# Patient Record
Sex: FEMALE | Race: WHITE | NOT HISPANIC OR LATINO | URBAN - METROPOLITAN AREA
[De-identification: names, ages, dates, MRNs, and addresses within clinical notes are randomized per-mention and may not be internally consistent; named-entity substitution may affect disease eponyms.]

---

## 2018-05-31 ENCOUNTER — CONVERSION ENCOUNTER (OUTPATIENT)
Dept: FAMILY MEDICINE CLINIC | Facility: CLINIC | Age: 48
End: 2018-05-31

## 2018-05-31 ENCOUNTER — OFFICE VISIT CONVERTED (OUTPATIENT)
Dept: FAMILY MEDICINE CLINIC | Facility: CLINIC | Age: 48
End: 2018-05-31
Attending: NURSE PRACTITIONER

## 2018-07-25 ENCOUNTER — CONVERSION ENCOUNTER (OUTPATIENT)
Dept: MAMMOGRAPHY | Facility: HOSPITAL | Age: 48
End: 2018-07-25

## 2019-02-15 ENCOUNTER — HOSPITAL ENCOUNTER (OUTPATIENT)
Dept: OTHER | Facility: HOSPITAL | Age: 49
Discharge: HOME | End: 2019-02-15

## 2019-02-15 LAB
ALBUMIN SERPL-MCNC: 4.3 G/DL (ref 3.5–5)
ALBUMIN/GLOB SERPL: 1.8 {RATIO} (ref 1.4–2.6)
ALP SERPL-CCNC: 73 U/L (ref 42–98)
ALT SERPL-CCNC: 16 U/L (ref 10–40)
ANION GAP SERPL CALC-SCNC: 15 MMOL/L (ref 8–19)
AST SERPL-CCNC: 16 U/L (ref 15–50)
BASOPHILS # BLD AUTO: 0.06 10*3/UL (ref 0–0.2)
BASOPHILS NFR BLD AUTO: 0.7 % (ref 0–3)
BILIRUB SERPL-MCNC: 0.39 MG/DL (ref 0.2–1.3)
BUN SERPL-MCNC: 14 MG/DL (ref 5–25)
BUN/CREAT SERPL: 19 {RATIO} (ref 6–20)
CALCIUM SERPL-MCNC: 9.7 MG/DL (ref 8.7–10.4)
CHLORIDE SERPL-SCNC: 109 MMOL/L (ref 99–111)
CHOLEST SERPL-MCNC: 167 MG/DL (ref 107–200)
CHOLEST/HDLC SERPL: 3 {RATIO} (ref 3–6)
CONV ABS IMM GRAN: 0.02 10*3/UL (ref 0–0.2)
CONV CO2: 23 MMOL/L (ref 22–32)
CONV IMMATURE GRAN: 0.2 % (ref 0–1.8)
CONV TOTAL PROTEIN: 6.7 G/DL (ref 6.3–8.2)
CREAT UR-MCNC: 0.73 MG/DL (ref 0.5–0.9)
DEPRECATED RDW RBC AUTO: 41 FL (ref 36.4–46.3)
EOSINOPHIL # BLD AUTO: 0.41 10*3/UL (ref 0–0.7)
EOSINOPHIL # BLD AUTO: 4.8 % (ref 0–7)
ERYTHROCYTE [DISTWIDTH] IN BLOOD BY AUTOMATED COUNT: 12.7 % (ref 11.7–14.4)
GFR SERPLBLD BASED ON 1.73 SQ M-ARVRAT: >60 ML/MIN/{1.73_M2}
GLOBULIN UR ELPH-MCNC: 2.4 G/DL (ref 2–3.5)
GLUCOSE SERPL-MCNC: 81 MG/DL (ref 65–99)
HBA1C MFR BLD: 14.9 G/DL (ref 12–16)
HCT VFR BLD AUTO: 44.4 % (ref 37–47)
HDLC SERPL-MCNC: 56 MG/DL (ref 40–60)
LDLC SERPL CALC-MCNC: 91 MG/DL (ref 70–100)
LYMPHOCYTES # BLD AUTO: 2.45 10*3/UL (ref 1–5)
MCH RBC QN AUTO: 29.5 PG (ref 27–31)
MCHC RBC AUTO-ENTMCNC: 33.6 G/DL (ref 33–37)
MCV RBC AUTO: 87.9 FL (ref 81–99)
MONOCYTES # BLD AUTO: 0.41 10*3/UL (ref 0.2–1.2)
MONOCYTES NFR BLD AUTO: 4.8 % (ref 3–10)
NEUTROPHILS # BLD AUTO: 5.11 10*3/UL (ref 2–8)
NEUTROPHILS NFR BLD AUTO: 60.5 % (ref 30–85)
NRBC CBCN: 0 % (ref 0–0.7)
OSMOLALITY SERPL CALC.SUM OF ELEC: 296 MOSM/KG (ref 273–304)
PLATELET # BLD AUTO: 356 10*3/UL (ref 130–400)
PMV BLD AUTO: 9.1 FL (ref 9.4–12.3)
POTASSIUM SERPL-SCNC: 4.2 MMOL/L (ref 3.5–5.3)
RBC # BLD AUTO: 5.05 10*6/UL (ref 4.2–5.4)
SODIUM SERPL-SCNC: 143 MMOL/L (ref 135–147)
TRIGL SERPL-MCNC: 100 MG/DL (ref 40–150)
TSH SERPL-ACNC: 0.01 M[IU]/L (ref 0.27–4.2)
VARIANT LYMPHS NFR BLD MANUAL: 29 % (ref 20–45)
VLDLC SERPL-MCNC: 20 MG/DL (ref 5–37)
WBC # BLD AUTO: 8.46 10*3/UL (ref 4.8–10.8)

## 2019-07-08 ENCOUNTER — HOSPITAL ENCOUNTER (OUTPATIENT)
Dept: FAMILY MEDICINE CLINIC | Facility: CLINIC | Age: 49
Discharge: HOME | End: 2019-07-08
Attending: NURSE PRACTITIONER

## 2019-07-08 ENCOUNTER — OFFICE VISIT CONVERTED (OUTPATIENT)
Dept: FAMILY MEDICINE CLINIC | Facility: CLINIC | Age: 49
End: 2019-07-08
Attending: NURSE PRACTITIONER

## 2019-07-12 LAB
CONV LAST MENSTURAL PERIOD: NORMAL
SPECIMEN SOURCE: NORMAL
SPECIMEN SOURCE: NORMAL
THIN PREP CVX: NORMAL

## 2019-08-05 ENCOUNTER — HOSPITAL ENCOUNTER (OUTPATIENT)
Dept: OTHER | Facility: HOSPITAL | Age: 49
Discharge: HOME | End: 2019-08-05
Attending: NURSE PRACTITIONER

## 2019-08-05 LAB — T4 FREE SERPL-MCNC: 1.2 NG/DL (ref 0.9–1.8)

## 2019-08-30 ENCOUNTER — OFFICE VISIT CONVERTED (OUTPATIENT)
Dept: FAMILY MEDICINE CLINIC | Facility: CLINIC | Age: 49
End: 2019-08-30
Attending: NURSE PRACTITIONER

## 2019-09-04 ENCOUNTER — HOSPITAL ENCOUNTER (OUTPATIENT)
Dept: MAMMOGRAPHY | Facility: HOSPITAL | Age: 49
Discharge: HOME | End: 2019-09-04
Attending: NURSE PRACTITIONER

## 2019-09-04 LAB
ALBUMIN SERPL-MCNC: 4.5 G/DL (ref 3.5–5)
ALBUMIN/GLOB SERPL: 1.7 {RATIO} (ref 1.4–2.6)
ALP SERPL-CCNC: 80 U/L (ref 42–98)
ALT SERPL-CCNC: 18 U/L (ref 10–40)
ANION GAP SERPL CALC-SCNC: 17 MMOL/L (ref 8–19)
AST SERPL-CCNC: 18 U/L (ref 15–50)
BASOPHILS # BLD AUTO: 0.06 10*3/UL (ref 0–0.2)
BASOPHILS NFR BLD AUTO: 0.6 % (ref 0–3)
BILIRUB SERPL-MCNC: 0.21 MG/DL (ref 0.2–1.3)
BUN SERPL-MCNC: 16 MG/DL (ref 5–25)
BUN/CREAT SERPL: 18 {RATIO} (ref 6–20)
CALCIUM SERPL-MCNC: 9.2 MG/DL (ref 8.7–10.4)
CHLORIDE SERPL-SCNC: 108 MMOL/L (ref 99–111)
CONV ABS IMM GRAN: 0.02 10*3/UL (ref 0–0.2)
CONV CO2: 20 MMOL/L (ref 22–32)
CONV IMMATURE GRAN: 0.2 % (ref 0–1.8)
CONV TOTAL PROTEIN: 7.2 G/DL (ref 6.3–8.2)
CREAT UR-MCNC: 0.9 MG/DL (ref 0.5–0.9)
DEPRECATED RDW RBC AUTO: 38.7 FL (ref 36.4–46.3)
EOSINOPHIL # BLD AUTO: 0.31 10*3/UL (ref 0–0.7)
EOSINOPHIL # BLD AUTO: 3.2 % (ref 0–7)
ERYTHROCYTE [DISTWIDTH] IN BLOOD BY AUTOMATED COUNT: 12 % (ref 11.7–14.4)
GFR SERPLBLD BASED ON 1.73 SQ M-ARVRAT: >60 ML/MIN/{1.73_M2}
GLOBULIN UR ELPH-MCNC: 2.7 G/DL (ref 2–3.5)
GLUCOSE SERPL-MCNC: 90 MG/DL (ref 65–99)
HCT VFR BLD AUTO: 44 % (ref 37–47)
HGB BLD-MCNC: 15.2 G/DL (ref 12–16)
LYMPHOCYTES # BLD AUTO: 3.19 10*3/UL (ref 1–5)
LYMPHOCYTES NFR BLD AUTO: 33.4 % (ref 20–45)
MCH RBC QN AUTO: 30.5 PG (ref 27–31)
MCHC RBC AUTO-ENTMCNC: 34.5 G/DL (ref 33–37)
MCV RBC AUTO: 88.4 FL (ref 81–99)
MONOCYTES # BLD AUTO: 0.58 10*3/UL (ref 0.2–1.2)
MONOCYTES NFR BLD AUTO: 6.1 % (ref 3–10)
NEUTROPHILS # BLD AUTO: 5.39 10*3/UL (ref 2–8)
NEUTROPHILS NFR BLD AUTO: 56.5 % (ref 30–85)
NRBC CBCN: 0 % (ref 0–0.7)
OSMOLALITY SERPL CALC.SUM OF ELEC: 293 MOSM/KG (ref 273–304)
PLATELET # BLD AUTO: 326 10*3/UL (ref 130–400)
PMV BLD AUTO: 9 FL (ref 9.4–12.3)
POTASSIUM SERPL-SCNC: 3.9 MMOL/L (ref 3.5–5.3)
RBC # BLD AUTO: 4.98 10*6/UL (ref 4.2–5.4)
SODIUM SERPL-SCNC: 141 MMOL/L (ref 135–147)
WBC # BLD AUTO: 9.55 10*3/UL (ref 4.8–10.8)

## 2019-09-06 ENCOUNTER — HOSPITAL ENCOUNTER (OUTPATIENT)
Dept: GENERAL RADIOLOGY | Facility: HOSPITAL | Age: 49
Discharge: HOME | End: 2019-09-06
Attending: NURSE PRACTITIONER

## 2020-02-25 ENCOUNTER — HOSPITAL ENCOUNTER (OUTPATIENT)
Dept: OTHER | Facility: HOSPITAL | Age: 50
Discharge: HOME | End: 2020-02-25

## 2020-02-25 LAB
ALBUMIN SERPL-MCNC: 4.2 G/DL (ref 3.5–5)
ALBUMIN/GLOB SERPL: 1.6 {RATIO} (ref 1.4–2.6)
ALP SERPL-CCNC: 71 U/L (ref 42–98)
ALT SERPL-CCNC: 24 U/L (ref 10–40)
ANION GAP SERPL CALC-SCNC: 17 MMOL/L (ref 8–19)
AST SERPL-CCNC: 18 U/L (ref 15–50)
BASOPHILS # BLD AUTO: 0.03 10*3/UL (ref 0–0.2)
BASOPHILS NFR BLD AUTO: 0.4 % (ref 0–3)
BILIRUB SERPL-MCNC: 0.19 MG/DL (ref 0.2–1.3)
BUN SERPL-MCNC: 10 MG/DL (ref 5–25)
BUN/CREAT SERPL: 11 {RATIO} (ref 6–20)
CALCIUM SERPL-MCNC: 9.4 MG/DL (ref 8.7–10.4)
CHLORIDE SERPL-SCNC: 108 MMOL/L (ref 99–111)
CHOLEST SERPL-MCNC: 151 MG/DL (ref 107–200)
CHOLEST/HDLC SERPL: 2.9 {RATIO} (ref 3–6)
CONV ABS IMM GRAN: 0.01 10*3/UL (ref 0–0.2)
CONV CO2: 20 MMOL/L (ref 22–32)
CONV IMMATURE GRAN: 0.1 % (ref 0–1.8)
CONV TOTAL PROTEIN: 6.8 G/DL (ref 6.3–8.2)
CREAT UR-MCNC: 0.89 MG/DL (ref 0.5–0.9)
DEPRECATED RDW RBC AUTO: 37.9 FL (ref 36.4–46.3)
EOSINOPHIL # BLD AUTO: 0.34 10*3/UL (ref 0–0.7)
EOSINOPHIL # BLD AUTO: 5 % (ref 0–7)
ERYTHROCYTE [DISTWIDTH] IN BLOOD BY AUTOMATED COUNT: 11.9 % (ref 11.7–14.4)
GFR SERPLBLD BASED ON 1.73 SQ M-ARVRAT: >60 ML/MIN/{1.73_M2}
GLOBULIN UR ELPH-MCNC: 2.6 G/DL (ref 2–3.5)
GLUCOSE SERPL-MCNC: 96 MG/DL (ref 65–99)
HCT VFR BLD AUTO: 41.2 % (ref 37–47)
HDLC SERPL-MCNC: 52 MG/DL (ref 40–60)
HGB BLD-MCNC: 14.3 G/DL (ref 12–16)
LDLC SERPL CALC-MCNC: 82 MG/DL (ref 70–100)
LYMPHOCYTES # BLD AUTO: 1.85 10*3/UL (ref 1–5)
LYMPHOCYTES NFR BLD AUTO: 27.1 % (ref 20–45)
MCH RBC QN AUTO: 30.3 PG (ref 27–31)
MCHC RBC AUTO-ENTMCNC: 34.7 G/DL (ref 33–37)
MCV RBC AUTO: 87.3 FL (ref 81–99)
MONOCYTES # BLD AUTO: 0.4 10*3/UL (ref 0.2–1.2)
MONOCYTES NFR BLD AUTO: 5.9 % (ref 3–10)
NEUTROPHILS # BLD AUTO: 4.19 10*3/UL (ref 2–8)
NEUTROPHILS NFR BLD AUTO: 61.5 % (ref 30–85)
NRBC CBCN: 0 % (ref 0–0.7)
OSMOLALITY SERPL CALC.SUM OF ELEC: 291 MOSM/KG (ref 273–304)
PLATELET # BLD AUTO: 274 10*3/UL (ref 130–400)
PMV BLD AUTO: 8.8 FL (ref 9.4–12.3)
POTASSIUM SERPL-SCNC: 4.2 MMOL/L (ref 3.5–5.3)
RBC # BLD AUTO: 4.72 10*6/UL (ref 4.2–5.4)
SODIUM SERPL-SCNC: 141 MMOL/L (ref 135–147)
TRIGL SERPL-MCNC: 83 MG/DL (ref 40–150)
TSH SERPL-ACNC: 0.01 M[IU]/L (ref 0.27–4.2)
VLDLC SERPL-MCNC: 17 MG/DL (ref 5–37)
WBC # BLD AUTO: 6.82 10*3/UL (ref 4.8–10.8)

## 2020-04-16 ENCOUNTER — OFFICE VISIT CONVERTED (OUTPATIENT)
Dept: FAMILY MEDICINE CLINIC | Facility: CLINIC | Age: 50
End: 2020-04-16
Attending: NURSE PRACTITIONER

## 2020-04-16 ENCOUNTER — HOSPITAL ENCOUNTER (OUTPATIENT)
Dept: FAMILY MEDICINE CLINIC | Facility: CLINIC | Age: 50
Discharge: HOME | End: 2020-04-16
Attending: NURSE PRACTITIONER

## 2020-04-18 LAB — BACTERIA UR CULT: NORMAL

## 2020-04-20 ENCOUNTER — HOSPITAL ENCOUNTER (OUTPATIENT)
Dept: GENERAL RADIOLOGY | Facility: HOSPITAL | Age: 50
Discharge: HOME | End: 2020-04-20
Attending: NURSE PRACTITIONER

## 2020-06-04 ENCOUNTER — HOSPITAL ENCOUNTER (OUTPATIENT)
Dept: GENERAL RADIOLOGY | Facility: HOSPITAL | Age: 50
Discharge: HOME | End: 2020-06-04
Attending: NURSE PRACTITIONER

## 2021-05-12 NOTE — PROGRESS NOTES
Progress Note      Patient Name: Nedra Tran   Patient ID: [id number]   Sex: Female   YOB: 1970    Primary Care Provider: Antonia TREVIZO   Referring Provider: Antonia TREVIZO    Visit Date: April 16, 2020    Provider: JOSELINE Lockett   Location: [facility]   Location Address: [address] [place] [address], [address]  [place], [place]  [id number]   Location Phone: [phone]          Chief Complaint  · Pelvic pain     Patient is c/o pelvic pain x 1 month states the pain is constant.  Denies any dysuria or n/v.       History Of Present Illness  Nedar Tran is a 50 year old /White female who presents for evaluation and treatment of:      the patient presents to the office today she has been having pelvic pain all the way across her lower abd for a month she is not sexually active she has not been tracking her periods so she has started tracking this her chest wall sternal pain has been acting up this week she had period 2 weeks ago.  She was having diarrhea and quit magnesium and now it is normal no pain with urination she has h/o cholecystitis.  Her UA looks normal       Past Medical History  Disease Name Date Onset Notes   Allergic rhinitis --  --    Attention deficit disorder 12/07/2014 --    Depression --  --    Hypothyroidism --  --    Migraine Headaches --  --    Pap smear for cervical cancer screening 7/8/2019 --    Screening Mammogram 7/25/18 scheduled 9/4/19         Past Surgical History  Procedure Name Date Notes   Adenoidectomy 1996  --    Cholecystectomy 2011 --    Colonoscopy 2014 --    Rhinoplasty 1996 --    Rib resection 1990-91 Top bilateral ribs removed   Tonsilectomy 1996 --          Medication List  Name Date Started Instructions   Calcium 500 + D 500 mg(1,250mg) -200 unit oral tablet  take 1 tablet by oral route 2 times a day   Claritin 10 mg oral tablet  take 1 tablet (10 mg) by oral route once daily   Cytomel 25 mcg oral tablet 07/08/2019 take 1 tablet by oral  route daily for 90 days for 90 days   EpiPen 2-Ranjit 0.3 mg/0.3 mL (1:1,000) injection auto-injector 11/08/2016 inject 0.3 milliliter (0.3 mg) by intramuscular route once as needed for anaphylaxis for 30 days   folic acid 1 mg oral tablet 07/08/2019 take 1 tablet (1 mg) by oral route once daily for 90 days   levothyroxine 125 mcg oral tablet 07/08/2019 TAKE TWO TABLETS BY MOUTH EVERY DAY for 90 days   MagOx 400 mg Oral Tablet  take 1 tablet by oral route 2 times a day   multivitamin oral tablet  take 1 tablet by oral route QD (OTC)   ondansetron 8 mg oral tablet,disintegrating 07/08/2019 1 po tid prn nausea and vomiting   Topamax 200 mg oral tablet 07/08/2019 take 1 tablet (200 mg) by oral route 2 times per day for 30 days for 90 days   Tums 300 mg (750 mg) Oral Tablet, Chewable  chew 4 tablets by oral route As needed   valacyclovir 1 gram oral tablet 01/03/2020 TAKE ONE TABLET BY MOUTH EVERY DAY for 90 days   Wellbutrin  mg oral tablet extended release 24 hr 07/08/2019 TAKE ONE TABLET BY MOUTH EVERY DAY for 90 days for 90 days   Zoloft 100 mg oral tablet 08/23/2019 take 1 tablet (100 mg) by oral route once daily for 90 days   Zomig ZMT 5 mg oral tablet,disintegrating 07/08/2019 USE AS DIRECTED for 30 days for 90 days   Zyrtec 10 mg Oral Tablet  take 1 tablet by oral route         Allergy List  Allergen Name Date Reaction Notes   [facility] anaphylaxis --    CEPHALOSPORINS --  --  --    Codeine Phosphate --  hives, itching --    Dilaudid --  --  --    [employer] anaphylaxis --    Latex --  --  --    morphine --  hives, itching --    Paxil --  hives, itching --    thimerosal --  --  --          Family Medical History  Disease Name Relative/Age Notes   Colon Neoplasm, Malignant Grandfather (maternal)/60s  Uncle/   still living current age 93   Ulcerative Colitis Father/   --    Heart Disease Grandfather (maternal)/  Grandfather (paternal)/  Grandmother (maternal)/  Grandmother (paternal)/   --     Hypertension Aunt/  Brother/  Father/  Grandfather (maternal)/  Grandfather (paternal)/  Grandmother (maternal)/  Grandmother (paternal)/  Mother/  Uncle/   --    Depression Brother/  Father/   --    Diabetes, unspecified type Grandfather (maternal)/   --          Reproductive History  Menstrual   Age Menarche: 16   Pregnancy Summary   Total Pregnancies: 4 Full Term: 2 Premature: 0   Ab Induced: 0 Ab Spontaneous: 0 Ectopics: 0   Multiples: 0 Livin         Social History  Finding Status Start/Stop Quantity Notes   Active but no formal exercise --  --/-- --  --    Alcohol Current some day --/-- socially Once a week    --  --/-- --  --    No known infection risk --  --/-- --  --    Tobacco Former  1 PPD --          Immunizations  NameDate Admin Mfg Trade Name Lot Number Route Inj VIS Given VIS Publication   Hepatitis [id number] [facility] HAVRIX-ADULT  IM LD 2018   Comments: tolerated well   Ronvtxsfi19/2019 SK[facility] Fluzone Quadrivalent  NE NE 2020    Comments:    Oiybjgjgj91/ SKB Fluarix, quadrivalent, preservative free  NE NE 2016    Comments: recieved [facility] per patient.   Jmrrkffho19/2014 SKB Not Entered 2A2KX NE NE 2015   Comments:    Tdap2016 SKB BOOSTRIX [id number] IM RD 2016   Comments: Pt tolerated well.         Review of Systems  · Constitutional  o Denies  o : fever, weight gain, weight loss, malaise/fatigue  · Eyes  o Denies  o : diplopia, recent changes, blurred vision,  · HENT  o Denies  o : sore throat, hearing changes, tinnitus, nose bleeding, sinus pain, nasal discharge, ear pain  · Cardiovascular  o Admits  o : chest wall sternal pain   o Denies  o : palpitation, claudication, pedal edema  · Respiratory  o Denies  o : shortness of breath, MURGUIA, cough  · Gastrointestinal  o Admits  o : lower abd pain, diarrhea with magnesium  o Denies  o : nausea, vomiting, reflux, diarrhea, constipation, blood in  "stools  · Genitourinary  o Denies  o : frequency, urgency, dysuria, vaginal discharge, incontinence, nocturia, irregular menses, hot flashes  · Neurologic  o Denies  o : unsteady gait, weakness, dizziness, H/A  · Musculoskeletal  o Denies  o : myalgias, joint pain, joint swelling  · Endocrine  o Denies  o : heat intolerance, cold intolerance, polyuria, polydipsia      Vitals  Date Time BP Position Site L\R Cuff Size HR RR TEMP (F) WT  HT  BMI kg/m2 BSA m2 O2 Sat        04/16/2020 09:43 /86 Sitting    82 - R   181lbs 2oz 5'  6\" 29.23 1.96           Physical Examination  · Constitutional  o Appearance  o : well-nourished, well developed, alert, in no acute distress  · Neck  o Thyroid  o : gland size normal, nontender, no nodules or masses present on palpation, thyroid motion normal during swallowing  · Respiratory  o Respiratory Effort  o : breathing unlabored  o Auscultation of Lungs  o : normal breath sounds throughout  · Cardiovascular  o Heart  o :   § Auscultation of Heart  § : regular rate and rhythm, no murmurs, gallops or rubs  § Palpation of Heart  § : normal apical impulse, no cardiac thrill present  o Peripheral Vascular System  o :   § Carotid Arteries  § : normal pulses bilaterally, no bruits present  § Extremities  § : no cyanosis, clubbing or edema; less than 2 second refill noted  · Gastrointestinal  o Abdominal Examination  o : abdomen nontender to palpation, tone normal without rigidity or guarding, no masses present, abdominal contour scaphoid  o Liver and spleen  o : no hepatomegaly present, liver nontender to palpation, spleen not palpable  · Skin and Subcutaneous Tissue  o General Inspection  o : no rashes or lesions present, no areas of discoloration  · Neurologic  o Mental Status Examination  o :   § Orientation  § : grossly oriented to person, place and time  o Cranial Nerves  o : cranial nerves intact and symmetric throughout  · Psychiatric  o Mood and Affect  o : mood normal, affect " appropriate, denies any SI/HI          Results  · In-Office Procedures  o Lab procedure  § IOP - Urinalysis without Microscopy (Clinitek) [facility] ([id number])   § Color Ur: Yellow   § Clarity Ur: Clear   § Glucose Ur Ql Strip: Negative   § Bilirub Ur Ql Strip: Negative   § Ketones Ur Ql Strip: Negative   § Sp Gr Ur Qn: 1.010   § Hgb Ur Ql Strip: Negative   § pH Ur-LsCnc: 6.0   § Prot Ur Ql Strip: Negative   § Urobilinogen Ur Strip-mCnc: 0.2 E.U./dL   § Nitrite Ur Ql Strip: Negative   § WBC Est Ur Ql Strip: Negative       Assessment  · Screening for depression     V79.0/Z13.89  · Pelvic pain     NOCODE/R10.2      Plan  · Orders  o Urine culture (33313, 51919) - NOCODE/R10.2 - 04/16/2020  o ACO-39: Current medications updated and reviewed () - - 04/16/2020  o ACO-18: Negative screen for clinical depression using a standardized tool () - - 04/16/2020  o ACO-14: Influenza immunization administered or previously received () - - 04/16/2020  o ACO-13: Fall Risk Screening with no falls in past year or only one fall without injury in the past year (1101F) - - 04/16/2020  o Pelvic ultrasound (88641, 85257) - - 04/16/2020  · Medications  o Medications have been Reconciled  o Transition of Care or Provider Policy  · Instructions  o Depression Screen completed and scanned into the EMR under the designated folder within the patient's documents.  o Today's PHQ-9 result is _0__  o Handouts were given to patient:   o Patient is taking medications as prescribed and doing well.   o Take all medications as prescribed/directed.  o Patient was educated/instructed on their diagnosis, treatment and medications prior to discharge from the clinic today.  o Patient instructed to seek medical attention urgently for new or worsening symptoms.  o Call the office with any concerns or questions.  o Chronic conditions reviewed and taken into consideration for today's treatment plan.  · Disposition  o Call or Return if symptoms worsen or  persist.  o follow up prn or as needed  o Care Transition            Electronically Signed by: JOSELINE Lockett -Author on April 26, 2020 09:42:18 PM

## 2021-05-15 VITALS
DIASTOLIC BLOOD PRESSURE: 86 MMHG | BODY MASS INDEX: 29.11 KG/M2 | HEART RATE: 82 BPM | SYSTOLIC BLOOD PRESSURE: 138 MMHG | HEIGHT: 66 IN | WEIGHT: 181.12 LBS

## 2021-05-15 VITALS
DIASTOLIC BLOOD PRESSURE: 70 MMHG | OXYGEN SATURATION: 95 % | WEIGHT: 191 LBS | SYSTOLIC BLOOD PRESSURE: 126 MMHG | HEIGHT: 66 IN | HEART RATE: 110 BPM | BODY MASS INDEX: 30.7 KG/M2

## 2021-05-15 VITALS
HEART RATE: 88 BPM | HEIGHT: 66 IN | SYSTOLIC BLOOD PRESSURE: 121 MMHG | WEIGHT: 191 LBS | BODY MASS INDEX: 30.7 KG/M2 | DIASTOLIC BLOOD PRESSURE: 75 MMHG | TEMPERATURE: 99.4 F

## 2021-05-16 VITALS
DIASTOLIC BLOOD PRESSURE: 78 MMHG | WEIGHT: 181.5 LBS | HEART RATE: 97 BPM | HEIGHT: 66 IN | SYSTOLIC BLOOD PRESSURE: 132 MMHG | BODY MASS INDEX: 29.17 KG/M2